# Patient Record
Sex: FEMALE | Race: WHITE | NOT HISPANIC OR LATINO | Employment: OTHER | URBAN - METROPOLITAN AREA
[De-identification: names, ages, dates, MRNs, and addresses within clinical notes are randomized per-mention and may not be internally consistent; named-entity substitution may affect disease eponyms.]

---

## 2017-12-13 ENCOUNTER — INITIAL CONSULT (OUTPATIENT)
Dept: OPHTHALMOLOGY | Facility: CLINIC | Age: 72
End: 2017-12-13
Payer: MEDICARE

## 2017-12-13 DIAGNOSIS — H25.11 NUCLEAR SCLEROSIS OF RIGHT EYE: Primary | ICD-10-CM

## 2017-12-13 PROCEDURE — 99999 PR PBB SHADOW E&M-NEW PATIENT-LVL II: CPT | Mod: PBBFAC,,, | Performed by: OPHTHALMOLOGY

## 2017-12-13 PROCEDURE — 92004 COMPRE OPH EXAM NEW PT 1/>: CPT | Mod: S$PBB,,, | Performed by: OPHTHALMOLOGY

## 2017-12-13 PROCEDURE — 99202 OFFICE O/P NEW SF 15 MIN: CPT | Mod: PBBFAC | Performed by: OPHTHALMOLOGY

## 2017-12-13 RX ORDER — NAPROXEN 500 MG/1
TABLET ORAL
COMMUNITY
Start: 2017-11-29 | End: 2020-01-07 | Stop reason: ALTCHOICE

## 2017-12-13 RX ORDER — ACETAMINOPHEN 500 MG
1 TABLET ORAL DAILY
COMMUNITY
End: 2021-03-16 | Stop reason: ALTCHOICE

## 2017-12-13 RX ORDER — ACETAMINOPHEN AND CODEINE PHOSPHATE 300; 15 MG/1; MG/1
1 TABLET ORAL EVERY 4 HOURS PRN
COMMUNITY
End: 2020-01-07 | Stop reason: ALTCHOICE

## 2017-12-13 RX ORDER — ESTRADIOL 0.1 MG/G
CREAM VAGINAL
COMMUNITY
Start: 2017-11-05 | End: 2019-06-10 | Stop reason: SDUPTHER

## 2017-12-13 RX ORDER — CHLORHEXIDINE GLUCONATE ORAL RINSE 1.2 MG/ML
SOLUTION DENTAL
COMMUNITY
Start: 2017-12-05 | End: 2020-05-06

## 2017-12-13 NOTE — LETTER
[facility] - Ophthalmology  Ophthalmology  [address]  [facility] [address]  Phone: [phone]  Fax: [phone]   December 13, 2017    Cisco Silva MD  [address] [place] [id number]    Patient: Rachell Vásquez   MR Number: [id number]   YOB: 1945   Date of Visit: 12/13/2017     Dear Dr. Silva:    Thank you for referring Rachell Vásquez to me for evaluation. Here is my assessment and plan of care:    Nuclear sclerosis of right eye    FB sensation OS after phaco  Getting better per patient.  Normal exam  Reassured.  Use tears.    If you have questions, please do not hesitate to call me. I look forward to following Rachell Vásquez along with you.        Sincerely,        Dominique Huizar MD       CC  No Recipients

## 2017-12-13 NOTE — PROGRESS NOTES
HPI     Referred by Dr Silva    S/p phaco w/IOL OS 3/14/17    Pt here for eval per Dr Silva.  Pt states she has FBS and pain OS (6 on   scale) off and on since cataract surgery. Pt states itching OS.  Pt denies   discharge and burning OS.  Pt states she was treated for a virus a couple   weeks after cataract surgery.  Pt states she sees a white area on her left   eye when looking in the mirror.     Thera Tears PRN OS  Lumigan QHS OD    Last edited by Helen Harris MA on 12/13/2017  1:19 PM.   (History)            Assessment /Plan     For exam results, see Encounter Report.    Nuclear sclerosis of right eye      FB sensation OS after phaco  Getting better per patient.  Normal exam  Reassured.  Use tears.

## 2017-12-13 NOTE — Clinical Note
December 13, 2017      Cisco Silva MD  [address] [facility] [address]           [facility] - Ophthalmology  [address]  [place] [address]  Phone: [phone]  Fax: [phone]          Patient: Rachell Vásquez   MR Number: [id number]   YOB: 1945   Date of Visit: 12/13/2017       Dear Dr. Thorne:    Thank you for referring Rachell Vásquez to me for evaluation. Attached you will find relevant portions of my assessment and plan of care.    If you have questions, please do not hesitate to call me. I look forward to following Rachell Vásquez along with you.    Sincerely,    Dominique Huizar MD    Enclosure  CC:  No Recipients    If you would like to receive this communication electronically, please contact [email or web address] or [phone] to request more information on [email or web address] Link access.    For providers and/or their staff who would like to refer a patient to [facility], please contact us through our one-stop-shop provider referral line, [facility], at [phone].    If you feel you have received this communication in error or would no longer like to receive these types of communications, please e-mail [email or web address]

## 2019-05-31 PROBLEM — Z09 FOLLOW-UP EXAMINATION FOLLOWING COMBINED TREATMENT: Status: ACTIVE | Noted: 2019-05-31

## 2019-05-31 PROBLEM — N94.819 IDIOPATHIC VULVODYNIA: Status: ACTIVE | Noted: 2019-05-31

## 2019-05-31 PROBLEM — Z23 ENCOUNTER FOR IMMUNIZATION: Status: ACTIVE | Noted: 2019-05-31

## 2019-05-31 PROBLEM — Z51.81 ENCOUNTER FOR THERAPEUTIC DRUG MONITORING: Status: ACTIVE | Noted: 2019-05-31

## 2019-05-31 PROBLEM — Z71.3 DIETARY COUNSELING: Status: ACTIVE | Noted: 2019-05-31

## 2019-05-31 PROBLEM — Z79.899 ENCOUNTER FOR LONG-TERM (CURRENT) USE OF MEDICATIONS: Status: ACTIVE | Noted: 2019-05-31

## 2019-09-02 PROBLEM — Z09 FOLLOW-UP EXAMINATION FOLLOWING COMBINED TREATMENT: Status: RESOLVED | Noted: 2019-05-31 | Resolved: 2019-09-02

## 2020-07-07 PROBLEM — Z12.11 SCREEN FOR COLON CANCER: Status: ACTIVE | Noted: 2020-07-07

## 2020-09-16 PROBLEM — N94.819 IDIOPATHIC VULVODYNIA: Chronic | Status: ACTIVE | Noted: 2019-05-31

## 2020-09-16 PROBLEM — Z12.11 SCREEN FOR COLON CANCER: Status: RESOLVED | Noted: 2020-07-07 | Resolved: 2020-09-16

## 2020-09-16 PROBLEM — Z79.899 ENCOUNTER FOR LONG-TERM (CURRENT) USE OF MEDICATIONS: Chronic | Status: ACTIVE | Noted: 2019-05-31

## 2021-03-16 PROBLEM — R41.3 SHORT-TERM MEMORY LOSS: Status: ACTIVE | Noted: 2021-03-16

## 2021-04-27 PROBLEM — G31.84 MILD COGNITIVE IMPAIRMENT WITH MEMORY LOSS: Status: ACTIVE | Noted: 2021-04-27

## 2021-10-27 PROBLEM — Z51.81 ENCOUNTER FOR THERAPEUTIC DRUG MONITORING: Status: RESOLVED | Noted: 2019-05-31 | Resolved: 2021-10-27

## 2021-10-27 PROBLEM — Z23 ENCOUNTER FOR IMMUNIZATION: Status: RESOLVED | Noted: 2019-05-31 | Resolved: 2021-10-27

## 2021-10-27 PROBLEM — Z71.3 DIETARY COUNSELING: Status: RESOLVED | Noted: 2019-05-31 | Resolved: 2021-10-27

## 2021-10-27 PROBLEM — Z79.899 ENCOUNTER FOR LONG-TERM (CURRENT) USE OF MEDICATIONS: Chronic | Status: RESOLVED | Noted: 2019-05-31 | Resolved: 2021-10-27

## 2022-02-28 PROBLEM — R73.03 PREDIABETES: Status: ACTIVE | Noted: 2022-02-28

## 2023-09-25 PROBLEM — I70.0 AORTIC ATHEROSCLEROSIS: Status: ACTIVE | Noted: 2023-09-25

## 2023-09-25 PROBLEM — N95.2 POSTMENOPAUSAL ATROPHIC VAGINITIS: Status: ACTIVE | Noted: 2023-09-25

## 2023-09-25 PROBLEM — E55.9 VITAMIN D DEFICIENCY: Status: ACTIVE | Noted: 2023-09-25

## 2023-12-06 PROBLEM — J45.20 RAD (REACTIVE AIRWAY DISEASE) WITH WHEEZING, MILD INTERMITTENT, UNCOMPLICATED: Status: ACTIVE | Noted: 2023-12-06

## 2024-03-24 PROBLEM — G30.1 MILD LATE ONSET ALZHEIMER'S DEMENTIA WITHOUT BEHAVIORAL DISTURBANCE, PSYCHOTIC DISTURBANCE, MOOD DISTURBANCE, OR ANXIETY: Status: ACTIVE | Noted: 2024-03-24

## 2024-03-24 PROBLEM — F02.A0 MILD LATE ONSET ALZHEIMER'S DEMENTIA WITHOUT BEHAVIORAL DISTURBANCE, PSYCHOTIC DISTURBANCE, MOOD DISTURBANCE, OR ANXIETY: Status: ACTIVE | Noted: 2024-03-24

## 2024-07-18 PROBLEM — R73.03 PREDIABETES: Chronic | Status: ACTIVE | Noted: 2022-02-28

## 2024-07-18 PROBLEM — F02.A0 MILD LATE ONSET ALZHEIMER'S DEMENTIA WITHOUT BEHAVIORAL DISTURBANCE, PSYCHOTIC DISTURBANCE, MOOD DISTURBANCE, OR ANXIETY: Status: ACTIVE | Noted: 2021-03-16

## 2024-07-18 PROBLEM — G30.1 MILD LATE ONSET ALZHEIMER'S DEMENTIA WITHOUT BEHAVIORAL DISTURBANCE, PSYCHOTIC DISTURBANCE, MOOD DISTURBANCE, OR ANXIETY: Status: ACTIVE | Noted: 2021-03-16

## 2024-08-15 PROBLEM — R76.8 POSITIVE ANA (ANTINUCLEAR ANTIBODY): Status: ACTIVE | Noted: 2024-08-15

## 2024-09-20 PROBLEM — R54 FRAIL ELDERLY: Status: ACTIVE | Noted: 2024-09-20

## 2024-09-20 PROBLEM — R26.2 WALKING DIFFICULTY DUE TO JOINT DISORDER INVOLVING MULTIPLE SITES: Status: ACTIVE | Noted: 2024-09-20

## 2025-01-17 ENCOUNTER — HOSPITAL ENCOUNTER (EMERGENCY)
Facility: HOSPITAL | Age: 80
Discharge: HOME | End: 2025-01-17
Attending: STUDENT IN AN ORGANIZED HEALTH CARE EDUCATION/TRAINING PROGRAM
Payer: MEDICARE

## 2025-01-17 VITALS
SYSTOLIC BLOOD PRESSURE: 160 MMHG | RESPIRATION RATE: 20 BRPM | DIASTOLIC BLOOD PRESSURE: 74 MMHG | HEART RATE: 75 BPM | OXYGEN SATURATION: 96 % | BODY MASS INDEX: 29.35 KG/M2 | WEIGHT: 155.44 LBS | HEIGHT: 61 IN | TEMPERATURE: 98 F

## 2025-01-17 DIAGNOSIS — S02.2XXA CLOSED FRACTURE OF NASAL BONE, INITIAL ENCOUNTER: ICD-10-CM
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
## 2025-01-17 PROCEDURE — 25000003 PHARM REV CODE 250: Performed by: STUDENT IN AN ORGANIZED HEALTH CARE EDUCATION/TRAINING PROGRAM

## 2025-01-17 PROCEDURE — 99285 EMERGENCY DEPT VISIT HI MDM: CPT | Mod: 25

## 2025-01-17 RX ORDER — ACETAMINOPHEN 325 MG/1
650 TABLET ORAL
Status: COMPLETED | OUTPATIENT
Start: 2025-01-17 | End: 2025-01-17

## 2025-01-17 RX ADMIN — ACETAMINOPHEN 650 MG: 325 TABLET ORAL at 01:01

## 2025-01-17 NOTE — ED PROVIDER NOTES
"Encounter Date: 1/17/2025       History     Chief Complaint   Patient presents with    Fall     PT ARRIVES TO THE ED WITH C/O HEAD INJURY TO THE RIGHT EYE AND NOSE AFTER A FALL THIS MORNING. - NEGATIVE BLOOD THINNERS. -LOC.     80-year-old female with history of Alzheimer's presenting after a mechanical fall this morning while walking back from her mailbox.  She reports that she tripped on the ground, and fell forward hitting her face on the ground.  No loss of consciousness.  Reports swelling to the bridge of her nose and her right forehead.  No blood thinners.  No numbness or weakness in bilateral upper or lower extremities, no vision changes.  Patient denies any other complaints.  No neck pain or back pain.      Review of patient's allergies indicates:   Allergen Reactions    Bystolic [nebivolol] Other (See Comments)     Real bad headache    Donepezil Other (See Comments)     Headaches, nervous, nausea and dizziness    Lyrica [pregabalin] Itching     Made her sick    Atorvastatin      Abd pain, nausea, cold sweats, weakness    Asa [aspirin]      bruising    Bextra [valdecoxib]      bruising    Celebrex [celecoxib]      bruising    Codeine      Stomach discomfort    Elavil [amitriptyline]      Hallucinate, dry mouth, dizziness    Feldene [piroxicam]      rash    Indocin [indomethacin sodium]      Stomach discomfort    Keflex [cephalexin]      Blurred vision        Lodine [etodolac]      Stomach discomfort    Mobic [meloxicam]      bruising    Motrin [ibuprofen]      Stomach discomfort    Neurontin [gabapentin]      "very sick"      Pravachol [pravastatin]      "muscle hurt real bad"    Tetracyclines      Hallucinate, dry mouth, dizziness    Valium [diazepam]      "speed"    Vioxx [rofecoxib]      Stomach discomfort    Pcn [penicillins] Rash     Rash       Past Medical History:   Diagnosis Date    Achilles tendinitis of left lower extremity     Adult osteochondrosis of cervical spine     Cervicalgia of " bjwncswe-yqdbona-jgfcr region     Chronic idiopathic constipation     Chronic non-seasonal allergic rhinitis     Degenerative scoliosis in adult patient     Diverticula of colon     Fibrocystic breast disease (FCBD)     Gastroesophageal reflux disease     Glaucoma     Hashimoto's thyroiditis     Hiatal hernia with gastroesophageal reflux disease without esophagitis     History of Helicobacter pylori infection 2010    History of shingles 01/2015    left flank    HLD (hyperlipidemia)     Hyperglycemia     Lumbar back pain with radiculopathy affecting left lower extremity     Lumbar spinal stenosis     severe left    Menopausal and female climacteric states     Metabolic syndrome     Mild cognitive impairment with memory loss 04/27/2021    Mild late onset Alzheimer's dementia without behavioral disturbance, psychotic disturbance, mood disturbance, or anxiety 03/16/2021    Osteopenia after menopause     Positive OXANA (antinuclear antibody) 08/15/2024    Cytoplasmic, 1:320    Prediabetes 02/28/2022    HbA1c 6.2    RAD (reactive airway disease) with wheezing, mild intermittent, uncomplicated     Short-term memory loss 03/16/2021    Varicose veins of bilateral lower extremities with other complications     Venous insufficiency (chronic) (peripheral)      Past Surgical History:   Procedure Laterality Date    CATARACT EXTRACTION, BILATERAL Bilateral 2017    CHOLECYSTECTOMY      COLONOSCOPY N/A 7/7/2020    Procedure: COLONOSCOPY;  Surgeon: Norberto Gottlieb MD;  Location: [facility];  Service: Endoscopy;  Laterality: N/A;    CORRECTION OF HAMMER TOE Right 2010    [facility]    EXCISION OF CYST OF LABIA  1989    FRACTURE SURGERY      R foot    GUM SURGERY Bilateral 1991    bone grafts    LIPOMA RESECTION Right 1991    scapula    SURGICAL REMOVAL OF BUNION WITH OSTEOTOMY OF METATARSAL BONE Right 2010    [facility]    SURGICAL REMOVAL OF ECHOLS'S NEUROMA Bilateral 2002    [facility]    TONSILLECTOMY, ADENOIDECTOMY  1977    TOTAL ABDOMINAL  HYSTERECTOMY  1975     Family History   Problem Relation Name Age of Onset    Diabetes Mother      Vision loss Mother      Blindness Mother      Glaucoma Mother      Blindness Father      Glaucoma Sister      Macular degeneration Neg Hx      Retinal detachment Neg Hx       Social History     Tobacco Use    Smoking status: Former     Current packs/day: 0.00     Types: Cigarettes     Quit date:      Years since quittin.0     Passive exposure: Past    Smokeless tobacco: Never   Substance Use Topics    Alcohol use: No    Drug use: No     Review of Systems   Constitutional:  Negative for fever.   HENT:  Negative for sore throat.         Facial trauma   Respiratory:  Negative for shortness of breath.    Cardiovascular:  Negative for chest pain.   Gastrointestinal:  Negative for nausea.   Genitourinary:  Negative for dysuria.   Musculoskeletal:  Negative for back pain.   Skin:  Negative for rash.   Neurological:  Negative for weakness.   Hematological:  Does not bruise/bleed easily.       Physical Exam     Initial Vitals [25 1222]   BP Pulse Resp Temp SpO2   (!) 173/78 80 20 98.4 °F (36.9 °C) (!) 94 %      MAP       --         Physical Exam    Nursing note and vitals reviewed.  Constitutional: She appears well-developed.   HENT:   Head: Normocephalic.   Hematoma to right forehead, as well as swelling over the bridge of the nose, no skin breaks.  No other signs of ecchymosis, abrasions, lacerations, or hematomas to head.  Right nostril unremarkable, dried blood in left nostril.  No septal hematoma. No blood in oropharynx. No dental instability. No tongue lacerations. No signs of skull fracture, no racoon eyes or gutierrez's sign. No neck TTP. No pain when ranging neck. No hemotympanum bilaterally. No opthalmoplegia.     Eyes: Pupils are equal, round, and reactive to light.   Neck:   Normal range of motion.  Cardiovascular:            No murmur heard.  Pulmonary/Chest: No respiratory distress.   Abdominal:  Abdomen is soft.   Musculoskeletal:         General: No edema.      Cervical back: Normal range of motion.     Neurological: She is alert.   Alert and oriented to person place and time. No facial droop. CN 2-12 intact. Fluent speech with expression and comprehension. Strength 5/5 and sensation intact in upper and lower extremities.    Skin: Skin is warm.   Psychiatric: She has a normal mood and affect.         ED Course   Procedures  Labs Reviewed - No data to display       Imaging Results              CT Cervical Spine Without Contrast (Final result)  Result time 01/17/25 13:47:22      Final result by Jeimy Luna MD (01/17/25 13:47:22)                   Impression:      Multilevel degenerative change of the cervical spine without fracture or subluxation.      Electronically signed by: Jeimy Luna MD  Date:    01/17/2025  Time:    13:47               Narrative:    EXAMINATION:  CT CERVICAL SPINE WITHOUT CONTRAST    CLINICAL HISTORY:  Neck trauma (Age >= 65y);    TECHNIQUE:  Low dose axial images, sagittal and coronal reformations were performed though the cervical spine.  Contrast was not administered.    COMPARISON:  None    FINDINGS:  The incidentally visualized soft tissue structures of the neck have a normal appearance.  The prevertebral soft tissues appear normal.  Emphysematous changes of the lung apices.    Mild reversal of the normal cervical lordosis.  Vertebral body heights are maintained.  Multilevel degenerative change with disc space narrowing, endplate sclerosis, uncovertebral spurring and facet arthropathy.  No fracture or subluxation is seen.                                       CT Maxillofacial Without Contrast (Final result)  Result time 01/17/25 13:50:54      Final result by Jeimy Luna MD (01/17/25 13:50:54)                   Impression:      Mildly depressed fracture of the nasal bridge with a minimally displaced fracture of the left nasal bone.      Electronically signed  by: Jeimy Luna MD  Date:    01/17/2025  Time:    13:50               Narrative:    EXAMINATION:  CT MAXILLOFACIAL WITHOUT CONTRAST    CLINICAL HISTORY:  Facial trauma, blunt;    TECHNIQUE:  Low dose axial images, sagittal and coronal reformations were obtained through the face.  Contrast was not administered.    COMPARISON:  None    FINDINGS:  Right frontal scalp hematoma extending into the paranasal region.    The orbits and their contents are intact.  No intraconal or extraconal abnormality.  No retrobulbar hematoma.    Mildly depressed fracture of the nasal bridge with a minimally displaced fracture of the left nasal bone.  The orbital walls, zygomatic arches, maxilla and mandible are intact.  Advanced degenerative changes of the bilateral temporomandibular joints.                                       CT Head Without Contrast (Final result)  Result time 01/17/25 13:37:26      Final result by Jeimy Luna MD (01/17/25 13:37:26)                   Impression:      Right frontal scalp hematoma.  No intracranial hemorrhage.    Age-appropriate cerebral volume loss with moderate patchy decreased attenuation supratentorial white matter while nonspecific suggestive for chronic ischemic change.    No evidence for acute intracranial hemorrhage.  Clinical correlation and further evaluation as warranted.      Electronically signed by: Jeimy Luna MD  Date:    01/17/2025  Time:    13:37               Narrative:    EXAMINATION:  CT HEAD WITHOUT CONTRAST    CLINICAL HISTORY:  Facial trauma, blunt;    TECHNIQUE:  Multiple sequential 5 mm axial images of the head without contrast.  Coronal and sagittal reformatted imaging from the axial acquisition.    COMPARISON:  04/14/2021    FINDINGS:  Right frontal scalp hematoma.  There is mild age-appropriate generalized cerebral volume loss.  Compensatory enlargement of the ventricle sulci and cisterns without hydrocephalus.  There is no midline shift or mass effect.  There  is moderate ill-defined decreased attenuation in the supratentorial white matter while nonspecific suggestive for chronic ischemic change.  There is no evidence for acute intracranial hemorrhage or sulcal effacement.  There is no midline shift or mass effect.  Prominent vascular calcifications.  Visualized paranasal sinuses and mastoid air cells are clear..                                       Medications   acetaminophen tablet 650 mg (650 mg Oral Given 1/17/25 1307)     Medical Decision Making  DDX:  Mechanical fall with facial trauma.  Patient appears neurologically intact.  Given age will screen for intracranial hemorrhage, spinal fracture, cervical fracture, maxillofacial fracture  DX:  CT head, face, maxillofacial  TX:  Analgesia PRN  Dispo:  Pending workup        Amount and/or Complexity of Data Reviewed  Radiology: ordered.    Risk  OTC drugs.                                      Clinical Impression:  Final diagnoses:  [W19.XXXA] Fall, initial encounter (Primary)  [S02.2XXA] Closed fracture of nasal bone, initial encounter          ED Disposition Condition    Discharge Stable          ED Prescriptions    None       Follow-up Information       Follow up With Specialties Details Why Contact Info    Nataly Harris NP Internal Medicine Schedule an appointment as soon as possible for a visit in 2 days  [address] [facility]  [address]  [facility] [address]  [phone]      [facility] - Emergency Dept Emergency Medicine  If symptoms worsen [address] [facility] [address]  [phone]    [facility] ENT Otolaryngology Schedule an appointment as soon as possible for a visit in 2 days  [address] [facility] [address]  [phone]             Kendall Jeffrey MD  01/17/25 1243       Kendall Jeffrey MD  01/17/25 2390